# Patient Record
Sex: MALE | Race: WHITE
[De-identification: names, ages, dates, MRNs, and addresses within clinical notes are randomized per-mention and may not be internally consistent; named-entity substitution may affect disease eponyms.]

---

## 2017-01-24 ENCOUNTER — OFFICE VISIT (OUTPATIENT)
Dept: INTERNAL MEDICINE | Facility: CLINIC | Age: 32
End: 2017-01-24

## 2017-01-24 VITALS
BODY MASS INDEX: 25.11 KG/M2 | HEIGHT: 70 IN | SYSTOLIC BLOOD PRESSURE: 113 MMHG | DIASTOLIC BLOOD PRESSURE: 77 MMHG | WEIGHT: 175.4 LBS | RESPIRATION RATE: 12 BRPM | HEART RATE: 70 BPM | OXYGEN SATURATION: 97 %

## 2017-01-24 DIAGNOSIS — S93.412D SPRAIN OF CALCANEOFIBULAR LIGAMENT OF LEFT ANKLE, SUBSEQUENT ENCOUNTER: Primary | ICD-10-CM

## 2017-01-24 PROBLEM — S93.409A SPRAIN OF ANKLE: Status: ACTIVE | Noted: 2017-01-24

## 2017-01-24 PROCEDURE — 99213 OFFICE O/P EST LOW 20 MIN: CPT | Performed by: INTERNAL MEDICINE

## 2020-06-29 ENCOUNTER — TELEPHONE (OUTPATIENT)
Dept: FAMILY MEDICINE CLINIC | Age: 35
End: 2020-06-29

## 2020-07-01 ENCOUNTER — OFFICE VISIT (OUTPATIENT)
Dept: FAMILY MEDICINE CLINIC | Age: 35
End: 2020-07-01
Payer: COMMERCIAL

## 2020-07-01 PROCEDURE — 99203 OFFICE O/P NEW LOW 30 MIN: CPT | Performed by: FAMILY MEDICINE

## 2020-07-01 RX ORDER — TIZANIDINE 4 MG/1
4 TABLET ORAL NIGHTLY
Qty: 14 TABLET | Refills: 1 | Status: SHIPPED | OUTPATIENT
Start: 2020-07-01 | End: 2020-07-09 | Stop reason: SDUPTHER

## 2020-07-01 RX ORDER — TRAMADOL HYDROCHLORIDE 50 MG/1
50 TABLET ORAL 2 TIMES DAILY
Qty: 20 TABLET | Refills: 0 | Status: SHIPPED | OUTPATIENT
Start: 2020-07-01 | End: 2020-07-09 | Stop reason: SDUPTHER

## 2020-07-01 RX ORDER — PREDNISONE 10 MG/1
TABLET ORAL
Qty: 19 TABLET | Refills: 0 | Status: SHIPPED | OUTPATIENT
Start: 2020-07-01 | End: 2020-07-09 | Stop reason: SDUPTHER

## 2020-07-01 SDOH — ECONOMIC STABILITY: INCOME INSECURITY: HOW HARD IS IT FOR YOU TO PAY FOR THE VERY BASICS LIKE FOOD, HOUSING, MEDICAL CARE, AND HEATING?: NOT HARD AT ALL

## 2020-07-01 SDOH — ECONOMIC STABILITY: TRANSPORTATION INSECURITY
IN THE PAST 12 MONTHS, HAS LACK OF TRANSPORTATION KEPT YOU FROM MEETINGS, WORK, OR FROM GETTING THINGS NEEDED FOR DAILY LIVING?: NO

## 2020-07-01 SDOH — ECONOMIC STABILITY: TRANSPORTATION INSECURITY
IN THE PAST 12 MONTHS, HAS THE LACK OF TRANSPORTATION KEPT YOU FROM MEDICAL APPOINTMENTS OR FROM GETTING MEDICATIONS?: NO

## 2020-07-01 SDOH — ECONOMIC STABILITY: FOOD INSECURITY: WITHIN THE PAST 12 MONTHS, YOU WORRIED THAT YOUR FOOD WOULD RUN OUT BEFORE YOU GOT MONEY TO BUY MORE.: NEVER TRUE

## 2020-07-01 SDOH — ECONOMIC STABILITY: FOOD INSECURITY: WITHIN THE PAST 12 MONTHS, THE FOOD YOU BOUGHT JUST DIDN'T LAST AND YOU DIDN'T HAVE MONEY TO GET MORE.: NEVER TRUE

## 2020-07-01 ASSESSMENT — ENCOUNTER SYMPTOMS
CHEST TIGHTNESS: 0
BACK PAIN: 1
SORE THROAT: 0
EYE PAIN: 0
EYE DISCHARGE: 0
SHORTNESS OF BREATH: 0
APNEA: 0
ABDOMINAL DISTENTION: 0
VOMITING: 0
CONSTIPATION: 0
DIARRHEA: 0
ABDOMINAL PAIN: 0
COLOR CHANGE: 0
TROUBLE SWALLOWING: 0
ANAL BLEEDING: 0
WHEEZING: 0
PHOTOPHOBIA: 0
SINUS PRESSURE: 0
NAUSEA: 0
FACIAL SWELLING: 0

## 2020-07-01 ASSESSMENT — PATIENT HEALTH QUESTIONNAIRE - PHQ9
2. FEELING DOWN, DEPRESSED OR HOPELESS: 0
SUM OF ALL RESPONSES TO PHQ QUESTIONS 1-9: 0
1. LITTLE INTEREST OR PLEASURE IN DOING THINGS: 0
SUM OF ALL RESPONSES TO PHQ9 QUESTIONS 1 & 2: 0
SUM OF ALL RESPONSES TO PHQ QUESTIONS 1-9: 0
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
## 2020-07-06 ENCOUNTER — TELEPHONE (OUTPATIENT)
Dept: FAMILY MEDICINE CLINIC | Age: 35
End: 2020-07-06

## 2020-07-09 RX ORDER — TIZANIDINE 4 MG/1
4 TABLET ORAL NIGHTLY
Qty: 14 TABLET | Refills: 1 | Status: SHIPPED | OUTPATIENT
Start: 2020-07-09 | End: 2020-08-31

## 2020-07-09 RX ORDER — TRAMADOL HYDROCHLORIDE 50 MG/1
50 TABLET ORAL 2 TIMES DAILY
Qty: 20 TABLET | Refills: 0 | Status: SHIPPED | OUTPATIENT
Start: 2020-07-09 | End: 2020-07-19

## 2020-07-09 RX ORDER — PREDNISONE 10 MG/1
TABLET ORAL
Qty: 19 TABLET | Refills: 0 | Status: SHIPPED | OUTPATIENT
Start: 2020-07-09

## 2020-07-09 NOTE — TELEPHONE ENCOUNTER
Gera Julien is calling to request a refill on the following medication(s):    Last Visit Date (If Applicable):  4/7/1503    Next Visit Date:    7/16/2020    Medication Request:  Requested Prescriptions     Pending Prescriptions Disp Refills    traMADol (ULTRAM) 50 MG tablet 20 tablet 0     Sig: Take 1 tablet by mouth 2 times daily for 10 days.     predniSONE (DELTASONE) 10 MG tablet 19 tablet 0     Sig: 3 tab a day x 3 days then 2 tab a day x 3 days and 1 tab a day x 4 days    tiZANidine (ZANAFLEX) 4 MG tablet 14 tablet 1     Sig: Take 1 tablet by mouth nightly

## 2020-07-10 ENCOUNTER — OFFICE VISIT (OUTPATIENT)
Dept: FAMILY MEDICINE CLINIC | Age: 35
End: 2020-07-10
Payer: COMMERCIAL

## 2020-07-10 VITALS
BODY MASS INDEX: 26.04 KG/M2 | SYSTOLIC BLOOD PRESSURE: 103 MMHG | DIASTOLIC BLOOD PRESSURE: 70 MMHG | WEIGHT: 186 LBS | OXYGEN SATURATION: 98 % | HEIGHT: 71 IN | TEMPERATURE: 97.5 F | HEART RATE: 57 BPM

## 2020-07-10 PROCEDURE — 99214 OFFICE O/P EST MOD 30 MIN: CPT | Performed by: FAMILY MEDICINE

## 2020-07-10 RX ORDER — TIZANIDINE HYDROCHLORIDE 2 MG/1
CAPSULE, GELATIN COATED ORAL
Qty: 30 CAPSULE | Refills: 0 | Status: SHIPPED | OUTPATIENT
Start: 2020-07-10

## 2020-07-10 RX ORDER — TRAMADOL HYDROCHLORIDE 50 MG/1
50 TABLET ORAL 2 TIMES DAILY
Qty: 30 TABLET | Refills: 0 | Status: SHIPPED | OUTPATIENT
Start: 2020-07-10 | End: 2020-07-25

## 2020-07-10 ASSESSMENT — ENCOUNTER SYMPTOMS
WHEEZING: 0
ANAL BLEEDING: 0
CONSTIPATION: 0
EYE PAIN: 0
CHEST TIGHTNESS: 0
FACIAL SWELLING: 0
APNEA: 0
VOMITING: 0
TROUBLE SWALLOWING: 0
NAUSEA: 0
SORE THROAT: 0
SINUS PRESSURE: 0
BACK PAIN: 0
ABDOMINAL PAIN: 1
EYE DISCHARGE: 0
SHORTNESS OF BREATH: 0
DIARRHEA: 0
PHOTOPHOBIA: 0
ABDOMINAL DISTENTION: 0
COLOR CHANGE: 0

## 2020-07-10 NOTE — PROGRESS NOTES
Mariluz Rodriguez MD, PhD [address].      Letty Mitchell is a 29 y.o. male who presents today for his medical conditions/complaints as noted below. Letty Mitchell is c/o of   Chief Complaint   Patient presents with    Pain     thoracic, base of neck,     Bloated     left lower side         HPI:     Abdominal Pain   This is a recurrent problem. The current episode started 1 to 4 weeks ago. The problem occurs intermittently. The pain is located in the LUQ. Associated symptoms include headaches. Pertinent negatives include no constipation, diarrhea, fever, frequency, hematuria, nausea or vomiting. No results found for: LABA1C          ( goal A1C is < 7)   No results found for: LABMICR  LDL Cholesterol (mg/dL)   Date Value   12/13/2016 93       (goal LDL is <100)   AST (U/L)   Date Value   12/13/2016 23     ALT (U/L)   Date Value   12/13/2016 21     BUN (mg/dL)   Date Value   12/13/2016 15     BP Readings from Last 3 Encounters:   07/10/20 103/70   01/24/17 113/77   01/21/17 111/72          (goal 120/80)    No past medical history on file. No past surgical history on file. Family History   Problem Relation Age of Onset    High Cholesterol Mother     Diabetes Mother        Social History     Tobacco Use    Smoking status: Current Some Day Smoker     Types: Cigarettes    Smokeless tobacco: Never Used   Substance Use Topics    Alcohol use: No      Current Outpatient Medications   Medication Sig Dispense Refill    traMADol (ULTRAM) 50 MG tablet Take 1 tablet by mouth 2 times daily for 15 days. One BID 30 tablet 0    tiZANidine (ZANAFLEX) 2 MG capsule One nightly 30 capsule 0    traMADol (ULTRAM) 50 MG tablet Take 1 tablet by mouth 2 times daily for 10 days.  20 tablet 0    predniSONE (DELTASONE) 10 MG tablet 3 tab a day x 3 days then 2 tab a day x 3 days and 1 tab a day x 4 days 19 tablet 0    tiZANidine (ZANAFLEX) 4 MG tablet Take 1 tablet by mouth nightly 14 tablet 1    ibuprofen (ADVIL;MOTRIN) 800 MG tablet Take 1 tablet by mouth every 8 hours as needed for Pain or Fever (Patient not taking: Reported on 7/1/2020) 15 tablet 0     No current facility-administered medications for this visit. No Known Allergies    Health Maintenance   Topic Date Due    Varicella vaccine (1 of 2 - 2-dose childhood series) 10/26/1986    Pneumococcal 0-64 years Vaccine (1 of 1 - PPSV23) 10/26/1991    HIV screen  10/26/2000    DTaP/Tdap/Td vaccine (1 - Tdap) 10/26/2004    Flu vaccine (1) 09/01/2020    Hepatitis A vaccine  Aged Out    Hepatitis B vaccine  Aged Out    Hib vaccine  Aged Out    Meningococcal (ACWY) vaccine  Aged Out       Subjective:      Review of Systems   Constitutional: Negative for fatigue, fever and unexpected weight change. HENT: Negative for congestion, ear discharge, facial swelling, sinus pressure, sore throat and trouble swallowing. Eyes: Negative for photophobia, pain and discharge. Respiratory: Negative for apnea, chest tightness, shortness of breath and wheezing. Cardiovascular: Negative for chest pain and palpitations. Gastrointestinal: Positive for abdominal pain. Negative for abdominal distention, anal bleeding, constipation, diarrhea, nausea and vomiting. Endocrine: Negative for cold intolerance, heat intolerance, polydipsia, polyphagia and polyuria. Genitourinary: Negative for difficulty urinating, flank pain, frequency and hematuria. Musculoskeletal: Positive for neck pain and neck stiffness. Negative for back pain and gait problem. Skin: Negative for color change and rash. Neurological: Positive for light-headedness and headaches. Negative for dizziness, syncope, facial asymmetry and speech difficulty. Hematological: Negative for adenopathy. Psychiatric/Behavioral: Positive for sleep disturbance.  Negative for agitation, behavioral problems, confusion, contusion, left, subsequent encounter                   Plan:    CT abdomen  Dietary modifications  Cervical collar  Tramadol prn  The current medical regimen is effective;  continue present plan and medications. Return in about 1 month (around 8/10/2020) for follow up. Orders Placed This Encounter   Procedures    CT ABDOMEN PELVIS W IV CONTRAST     Standing Status:   Future     Standing Expiration Date:   7/10/2021     Order Specific Question:   Reason for exam:     Answer:   Abdomen pain    XR CERVICAL SPINE (4-5 VIEWS)     Standing Status:   Future     Standing Expiration Date:   7/10/2021     Order Specific Question:   Reason for exam:     Answer:   neck pain        Patient given educational materials - see patient instructions. Discussed use, benefit,and side effects of prescribed medications. All patient questions answered. Pt voiced understanding. Reviewed health maintenance. Instructed to continue current medications, diet and exercise. Patient agreed withtreatment plan. Follow up as directed.      Electronically signed by Cassidy Xiao MD on 7/10/2020 at 6:41 PM

## 2020-07-30 ENCOUNTER — TELEPHONE (OUTPATIENT)
Dept: FAMILY MEDICINE CLINIC | Age: 35
End: 2020-07-30

## 2020-07-30 NOTE — TELEPHONE ENCOUNTER
Patient states he had a fall and they are recommended surgery on his ankle patient functioned his tibia and fibula patient wanted dr Veronica Postal opinion on what he should do. Patient was seen at [facility] & [facility] for this.  Please advise

## 2020-08-06 ENCOUNTER — OFFICE VISIT (OUTPATIENT)
Dept: FAMILY MEDICINE CLINIC | Age: 35
End: 2020-08-06
Payer: COMMERCIAL

## 2020-08-06 VITALS
HEIGHT: 71 IN | OXYGEN SATURATION: 99 % | SYSTOLIC BLOOD PRESSURE: 129 MMHG | BODY MASS INDEX: 25.9 KG/M2 | WEIGHT: 185 LBS | DIASTOLIC BLOOD PRESSURE: 72 MMHG | HEART RATE: 68 BPM

## 2020-08-06 PROCEDURE — 99214 OFFICE O/P EST MOD 30 MIN: CPT | Performed by: FAMILY MEDICINE

## 2020-08-06 ASSESSMENT — ENCOUNTER SYMPTOMS
DIARRHEA: 0
WHEEZING: 0
COLOR CHANGE: 0
SINUS PRESSURE: 0
TROUBLE SWALLOWING: 0
PHOTOPHOBIA: 0
FACIAL SWELLING: 0
SORE THROAT: 0
ANAL BLEEDING: 0
ABDOMINAL DISTENTION: 0
EYE DISCHARGE: 0
EYE PAIN: 0
CONSTIPATION: 0
SHORTNESS OF BREATH: 0
CHEST TIGHTNESS: 0
ABDOMINAL PAIN: 0
BACK PAIN: 1
APNEA: 0
NAUSEA: 0
VOMITING: 0

## 2020-08-06 NOTE — PROGRESS NOTES
Ranjeet Sawant MD, PhD [address].      Carli Cain is a 29 y.o. male who presents today for his medical conditions/complaints as noted below. Carli Cain is c/o of   Chief Complaint   Patient presents with   [facility] Other     discuss possible surgery         HPI:     Other   This is a new (trimalleolar ankle fracture ) problem. The current episode started 1 to 4 weeks ago. The problem has been unchanged. Associated symptoms include arthralgias. Pertinent negatives include no abdominal pain, chest pain, congestion, fatigue, fever, nausea, neck pain, rash, sore throat or vomiting. No results found for: LABA1C          ( goal A1C is < 7)   No results found for: LABMICR  LDL Cholesterol (mg/dL)   Date Value   2016 93       (goal LDL is <100)   AST (U/L)   Date Value   2016 23     ALT (U/L)   Date Value   2016 21     BUN (mg/dL)   Date Value   2016 15     BP Readings from Last 3 Encounters:   20 129/72   07/10/20 103/70   17 113/77          (goal 120/80)    No past medical history on file. No past surgical history on file.     Family History   Problem Relation Age of Onset    High Cholesterol Mother     Diabetes Mother        Social History     Tobacco Use    Smoking status: Former Smoker     Types: Cigarettes     Last attempt to quit: 2020     Years since quittin.0    Smokeless tobacco: Never Used   Substance Use Topics    Alcohol use: No      Current Outpatient Medications   Medication Sig Dispense Refill    tiZANidine (ZANAFLEX) 2 MG capsule One nightly (Patient not taking: Reported on 2020) 30 capsule 0    predniSONE (DELTASONE) 10 MG tablet 3 tab a day x 3 days then 2 tab a day x 3 days and 1 tab a day x 4 days (Patient not taking: Reported on 2020) 19 tablet 0    tiZANidine (ZANAFLEX) 4 MG tablet Take 1 tablet by mouth nightly (Patient not taking: Reported on 8/6/2020) 14 tablet 1    ibuprofen (ADVIL;MOTRIN) 800 MG tablet Take 1 tablet by mouth every 8 hours as needed for Pain or Fever (Patient not taking: Reported on 7/1/2020) 15 tablet 0     No current facility-administered medications for this visit. No Known Allergies    Health Maintenance   Topic Date Due    Varicella vaccine (1 of 2 - 2-dose childhood series) 10/26/1986    Pneumococcal 0-64 years Vaccine (1 of 1 - PPSV23) 10/26/1991    HIV screen  10/26/2000    DTaP/Tdap/Td vaccine (1 - Tdap) 10/26/2004    Flu vaccine (1) 09/01/2020    Hepatitis A vaccine  Aged Out    Hepatitis B vaccine  Aged Out    Hib vaccine  Aged Out    Meningococcal (ACWY) vaccine  Aged Out       Subjective:      Review of Systems   Constitutional: Negative for fatigue, fever and unexpected weight change. HENT: Negative for congestion, ear discharge, facial swelling, sinus pressure, sore throat and trouble swallowing. Eyes: Negative for photophobia, pain and discharge. Respiratory: Negative for apnea, chest tightness, shortness of breath and wheezing. Cardiovascular: Negative for chest pain and palpitations. Gastrointestinal: Negative for abdominal distention, abdominal pain, anal bleeding, constipation, diarrhea, nausea and vomiting. Endocrine: Negative for cold intolerance, heat intolerance, polydipsia, polyphagia and polyuria. Genitourinary: Negative for difficulty urinating, flank pain, frequency and hematuria. Musculoskeletal: Positive for arthralgias, back pain and gait problem. Negative for neck pain. Skin: Negative for color change and rash. Neurological: Negative for dizziness, syncope, facial asymmetry, speech difficulty and light-headedness. Hematological: Negative for adenopathy. Psychiatric/Behavioral: Negative for agitation, behavioral problems, confusion, hallucinations and suicidal ideas. The patient is nervous/anxious. The patient is not hyperactive.         Objective: patient questions answered. Pt voiced understanding. Reviewed health maintenance. Instructed to continue current medications, diet and exercise. Patient agreed withtreatment plan. Follow up as directed.      Electronically signed by Jose Modi MD on 8/6/2020 at 1:16 PM

## 2020-08-31 RX ORDER — TIZANIDINE 4 MG/1
4 TABLET ORAL NIGHTLY
Qty: 14 TABLET | Refills: 1 | Status: SHIPPED | OUTPATIENT
Start: 2020-08-31

## 2020-08-31 NOTE — TELEPHONE ENCOUNTER
Heddy Bence is calling to request a refill on the following medication(s):    Last Visit Date (If Applicable):  9/5/3537    Next Visit Date:    Visit date not found    Medication Request:  Requested Prescriptions     Pending Prescriptions Disp Refills    tiZANidine (ZANAFLEX) 4 MG tablet [Pharmacy Med Name: TIZANIDINE HCL 4 MG TABLET 4 TAB] 14 tablet 1     Sig: TAKE 1 TABLET BY MOUTH NIGHTLY